# Patient Record
Sex: MALE | Race: BLACK OR AFRICAN AMERICAN | NOT HISPANIC OR LATINO | Employment: UNEMPLOYED | URBAN - METROPOLITAN AREA
[De-identification: names, ages, dates, MRNs, and addresses within clinical notes are randomized per-mention and may not be internally consistent; named-entity substitution may affect disease eponyms.]

---

## 2024-07-22 ENCOUNTER — HOSPITAL ENCOUNTER (EMERGENCY)
Facility: CLINIC | Age: 29
Discharge: HOME | End: 2024-07-22
Attending: EMERGENCY MEDICINE | Admitting: EMERGENCY MEDICINE

## 2024-07-22 ENCOUNTER — APPOINTMENT (OUTPATIENT)
Dept: CT IMAGING | Facility: CLINIC | Age: 29
End: 2024-07-22

## 2024-07-22 VITALS
TEMPERATURE: 97.8 F | BODY MASS INDEX: 22.73 KG/M2 | DIASTOLIC BLOOD PRESSURE: 87 MMHG | HEIGHT: 68 IN | HEART RATE: 55 BPM | SYSTOLIC BLOOD PRESSURE: 134 MMHG | OXYGEN SATURATION: 99 % | RESPIRATION RATE: 18 BRPM | WEIGHT: 150 LBS

## 2024-07-22 DIAGNOSIS — K60.30 ANAL FISTULA: ICD-10-CM

## 2024-07-22 LAB
ALBUMIN SERPL BCG-MCNC: 4.9 G/DL (ref 3.5–5.2)
ALP SERPL-CCNC: 87 U/L (ref 40–150)
ALT SERPL W P-5'-P-CCNC: 14 U/L (ref 0–70)
ANION GAP SERPL CALCULATED.3IONS-SCNC: 11 MMOL/L (ref 7–15)
AST SERPL W P-5'-P-CCNC: 28 U/L (ref 0–45)
BILIRUB DIRECT SERPL-MCNC: <0.2 MG/DL (ref 0–0.3)
BILIRUB SERPL-MCNC: 0.3 MG/DL
BUN SERPL-MCNC: 7.1 MG/DL (ref 6–20)
CALCIUM SERPL-MCNC: 9.7 MG/DL (ref 8.8–10.4)
CHLORIDE SERPL-SCNC: 104 MMOL/L (ref 98–107)
CREAT SERPL-MCNC: 0.88 MG/DL (ref 0.67–1.17)
EGFRCR SERPLBLD CKD-EPI 2021: >90 ML/MIN/1.73M2
ERYTHROCYTE [DISTWIDTH] IN BLOOD BY AUTOMATED COUNT: 11.8 % (ref 10–15)
GLUCOSE SERPL-MCNC: 94 MG/DL (ref 70–99)
HCO3 SERPL-SCNC: 25 MMOL/L (ref 22–29)
HCT VFR BLD AUTO: 47.5 % (ref 40–53)
HGB BLD-MCNC: 16.2 G/DL (ref 13.3–17.7)
LIPASE SERPL-CCNC: 26 U/L (ref 13–60)
MCH RBC QN AUTO: 28.9 PG (ref 26.5–33)
MCHC RBC AUTO-ENTMCNC: 34.1 G/DL (ref 31.5–36.5)
MCV RBC AUTO: 85 FL (ref 78–100)
PLATELET # BLD AUTO: 259 10E3/UL (ref 150–450)
POTASSIUM SERPL-SCNC: 3.9 MMOL/L (ref 3.4–5.3)
PROT SERPL-MCNC: 8.1 G/DL (ref 6.4–8.3)
RBC # BLD AUTO: 5.61 10E6/UL (ref 4.4–5.9)
SODIUM SERPL-SCNC: 140 MMOL/L (ref 135–145)
WBC # BLD AUTO: 10.2 10E3/UL (ref 4–11)

## 2024-07-22 PROCEDURE — 85027 COMPLETE CBC AUTOMATED: CPT

## 2024-07-22 PROCEDURE — 36415 COLL VENOUS BLD VENIPUNCTURE: CPT

## 2024-07-22 PROCEDURE — 80053 COMPREHEN METABOLIC PANEL: CPT

## 2024-07-22 PROCEDURE — 250N000011 HC RX IP 250 OP 636: Performed by: EMERGENCY MEDICINE

## 2024-07-22 PROCEDURE — 82947 ASSAY GLUCOSE BLOOD QUANT: CPT

## 2024-07-22 PROCEDURE — 72193 CT PELVIS W/DYE: CPT

## 2024-07-22 PROCEDURE — 99285 EMERGENCY DEPT VISIT HI MDM: CPT | Mod: 25

## 2024-07-22 PROCEDURE — 83690 ASSAY OF LIPASE: CPT

## 2024-07-22 RX ORDER — IOPAMIDOL 755 MG/ML
90 INJECTION, SOLUTION INTRAVASCULAR ONCE
Status: COMPLETED | OUTPATIENT
Start: 2024-07-22 | End: 2024-07-22

## 2024-07-22 RX ADMIN — IOPAMIDOL 90 ML: 755 INJECTION, SOLUTION INTRAVENOUS at 11:38

## 2024-07-22 ASSESSMENT — COLUMBIA-SUICIDE SEVERITY RATING SCALE - C-SSRS
2. HAVE YOU ACTUALLY HAD ANY THOUGHTS OF KILLING YOURSELF IN THE PAST MONTH?: NO
6. HAVE YOU EVER DONE ANYTHING, STARTED TO DO ANYTHING, OR PREPARED TO DO ANYTHING TO END YOUR LIFE?: NO
1. IN THE PAST MONTH, HAVE YOU WISHED YOU WERE DEAD OR WISHED YOU COULD GO TO SLEEP AND NOT WAKE UP?: NO

## 2024-07-22 ASSESSMENT — ACTIVITIES OF DAILY LIVING (ADL): ADLS_ACUITY_SCORE: 35

## 2024-07-22 NOTE — ED TRIAGE NOTES
PT states he has rectal pain and rectal bleeding. 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 He states the pain today is the worst it has ever been.      Triage Assessment (Adult)       Row Name 07/22/24 0933          Triage Assessment    Airway WDL WDL        Respiratory WDL    Respiratory WDL WDL        Skin Circulation/Temperature WDL    Skin Circulation/Temperature WDL WDL        Cardiac WDL    Cardiac WDL WDL        Peripheral/Neurovascular WDL    Peripheral Neurovascular WDL WDL        Cognitive/Neuro/Behavioral WDL    Cognitive/Neuro/Behavioral WDL WDL

## 2024-07-22 NOTE — DISCHARGE INSTRUCTIONS
You were seen in the emergency department for evaluation of rectal pain. Thankfully, there are no signs of blood stream infection on your labs or imaging.  I prescribed you an antibiotic to take for the next week.  Please take this 2 times a day with food to help prevent GI upset.    Please call the colorectal surgeons today when you leave to set up a follow-up appointment.    You can take tylenol and ibuprofen for pain. You may take 600 mg of ibuprofen (Advil) every 6 hours and 1000 mg acetaminophen (Tylenol) every 6 hours for pain. Do not take more than 3200 mg of ibuprofen (Advil) in 24 hours. Do not take more than 4000 mg of acetaminophen (Tylenol) in 24 hours. You may take this much for 1-3 days, then only use as needed.     Please return to the emergency department if you experience fever, uncontrollable pain, or any other concerning symptoms.

## 2024-07-22 NOTE — ED PROVIDER NOTES
"Emergency Department Midlevel Supervisory Note     I had a face to face encounter with this patient seen by the Advanced Practice Provider (GIAN). I personally made/approved the management plan and take responsibility for the patient management. I personally saw patient and performed a substantive portion of the visit including all aspects of the medical decision making.     ED Course:  10:42 AM Stacia Mackay PA-C staffed patient with me. I agree with their assessment and plan of management, and I will see the patient.  12:19 PM I met with the patient to introduce myself, gather additional history, perform my initial exam, and discuss the plan.     Brief HPI:     Tao Gautam is a 29 year old male who presents for evaluation of     I, Stuart Toussaint, am serving as a scribe to document services personally performed by Дмитрий Ingram MD, based on my observations and the provider's statements to me. I, Дмитрий Ingram MD, attest that Stuart Toussaint is acting in a scribe capacity, has observed my performance of the services and has documented them in accordance with my direction.      Brief Physical Exam: /87   Pulse 55   Temp 97.8  F (36.6  C) (Oral)   Resp 18   Ht 1.727 m (5' 8\")   Wt 68 kg (150 lb)   SpO2 99%   BMI 22.81 kg/m    Constitutional:  Alert, in no acute distress  EYES: Conjunctivae clear  HENT:  Atraumatic  Respiratory:  Respirations even, unlabored, in no acute respiratory distress  Cardiovascular:  Regular rate and rhythm, good peripheral perfusion  GI: Soft, nontender  Musculoskeletal:  Moves all 4 extremities equally, grossly symmetrical strength  Integument: Warm & dry. No appreciable rash, erythema.  Neurologic:  Alert & oriented, speech clear and fluent, no focal deficits noted  Psych: Normal mood and affect       MDM:  Patient is a 29-year-old male presents the emergency department for concerns about rectal pain.  He reportedly in the past had been diagnosed with a perianal fistula. "  He has had increased pain and drainage from this area recently.  He appears nontoxic on arrival with reassuring vital signs, exam, and lab studies.  CT does show the fistula tract, borderline thickening of a portion of this but no severe fluid collections, abscess, etc.  I do think the patient can be managed outpatient with oral antibiotics and a prompt referral to general/colorectal surgery.  He was given contact information for follow-up.  He agrees remainder of ED course and plan as documented by ANA Palomo.      1. Anal fistula          Labs and Imaging:  Results for orders placed or performed during the hospital encounter of 07/22/24   CT Pelvis Soft Tissue w Contrast    Impression    FINDINGS AND IMPRESSION:  There is a soft tissue tract from the posterior aspect of the anus to the medial left gluteal fold, measuring approximately 4 cm in length. The tract may contain fluid measuring 3 mm in width though this is not well assessed with CT. No large or abscess   appreciated on this study. Consider pelvic MRI. Scrotal hydrocele noted.          IMPRESSION:  1.       CBC with platelets   Result Value Ref Range    WBC Count 10.2 4.0 - 11.0 10e3/uL    RBC Count 5.61 4.40 - 5.90 10e6/uL    Hemoglobin 16.2 13.3 - 17.7 g/dL    Hematocrit 47.5 40.0 - 53.0 %    MCV 85 78 - 100 fL    MCH 28.9 26.5 - 33.0 pg    MCHC 34.1 31.5 - 36.5 g/dL    RDW 11.8 10.0 - 15.0 %    Platelet Count 259 150 - 450 10e3/uL   Basic metabolic panel   Result Value Ref Range    Sodium 140 135 - 145 mmol/L    Potassium 3.9 3.4 - 5.3 mmol/L    Chloride 104 98 - 107 mmol/L    Carbon Dioxide (CO2) 25 22 - 29 mmol/L    Anion Gap 11 7 - 15 mmol/L    Urea Nitrogen 7.1 6.0 - 20.0 mg/dL    Creatinine 0.88 0.67 - 1.17 mg/dL    GFR Estimate >90 >60 mL/min/1.73m2    Calcium 9.7 8.8 - 10.4 mg/dL    Glucose 94 70 - 99 mg/dL   Hepatic function panel   Result Value Ref Range    Protein Total 8.1 6.4 - 8.3 g/dL    Albumin 4.9 3.5 - 5.2 g/dL    Bilirubin Total 0.3  <=1.2 mg/dL    Alkaline Phosphatase 87 40 - 150 U/L    AST 28 0 - 45 U/L    ALT 14 0 - 70 U/L    Bilirubin Direct <0.20 0.00 - 0.30 mg/dL   Result Value Ref Range    Lipase 26 13 - 60 U/L           Mariusz Ingram MD  [facility] EMERGENCY ROOM  [facility] [facility] [address] [phone]       Дмитрий Ingram MD  07/22/24 0192

## 2024-07-22 NOTE — ED PROVIDER NOTES
"Emergency Department Encounter   NAME: Tao Gautam  AGE: 29 year old male  YOB: 1995  MRN: [id number]    PCP: No primary care provider on file.  ED PROVIDER: Stacia Mackay PA-C    Evaluation Date & Time:   No admission date for patient encounter.    CHIEF COMPLAINT:  Rectal/perineal Pain      Impression and Plan   MDM: 29-year-old male with no pertinent history presents for evaluation of rectal pain. Patient notes a history of anal fissure from 2019.  At that time he was on immigration facility and they had plan to surgery, but he had left the facility prior to surgery being done. States that over the last month he has had increased pain, bleeding, and drainage from the area. Subjective fevers. Normal bowel movement this morning. A few episodes of vomiting over the last couple days without any abdominal pain. On arrival here patient is vitally stable. Afebrile. No tachycardia. On my examination patient is in no acute distress.  Abdomen is soft and nontender.  External rectal exam showed a small area of induration about 1 cm from the anus at the 9 o'clock position.  No palpable fluctuance.  No erythema.  On palpation I was able to express a small amount of clear fluid from this area, no purulence.  Abdomen is soft and nontender, I have low suspicion for pancreatitis, cholecystitis, obstruction, ischemia.  When I discussed this with the patient, he states that anytime he feels nauseous he self induces vomiting because he likes to \"get it all out\". I offered zofran, but patient declined at this time. Discussed this with patient and he is agreeable to foregoing any abdominal imaging today.  We will do abdominal labs and pursue further imaging if there are any egregious abnormalities.  We discussed plans for pelvis CT to assess for abscess.   Patient declines any pain medication at this time.     Very reassuring lab workup here.  No leukocytosis concerning for ongoing systemic infection/inflammatory " response.  No anemia.  No electrolyte abnormality or [place].  Normal lipase, pancreatitis unlikely.  Normal LFTs, hepatobiliary obstruction is unlikely. CT pelvis with contrast showed tract 4 cm in length from the posterior anus to the medial left gluteal fold without any abscess appreciated.  I discussed all lab and imaging findings with the patient.  Overall reassuring without any evidence of abscess or systemic infection.  We reviewed plan for discharge home with a course of Augmentin and follow-up with surgery.  We reviewed OTC pain control options.  I gave him the phone number for colorectal surgery and also referred him to general surgery.  We discussed use and side effects of Augmentin.  We reviewed strict return precautions and patient was discharged home in stable condition.    I have staffed the patient with Dr. Ingram, ED physician, who will evaluate the patient and agrees with all aspects of today's care.          Medical Decision Making  Obtained supplemental history:Supplemental history obtained?: No  Reviewed external records: External records reviewed?: No  Care impacted by chronic illness:N/A  Care significantly affected by social determinants of health:N/A  Did you consider but not order tests?: Work up considered but not performed and documented in chart, if applicable  Did you interpret images independently?: My independent interpretation of imaging: none  Consultation discussion with other provider:Did you involve another provider (consultant, MH, pharmacy, etc.)?: No  Discharge. I prescribed additional prescription strength medication(s) as charted. N/A.   At the conclusion of the encounter I discussed the results of all the tests and the disposition. The questions were answered. The patient or family acknowledged understanding and was agreeable with the care plan.    FINAL IMPRESSION:    ICD-10-CM    1. Anal fistula  K60.3 Adult Gen Surg  Referral            MEDICATIONS GIVEN IN THE  "EMERGENCY DEPARTMENT:  Medications   iopamidol (ISOVUE-370) solution 90 mL (90 mLs Intravenous $Given 7/22/24 4978)         NEW PRESCRIPTIONS STARTED AT TODAY'S ED VISIT:  Discharge Medication List as of 7/22/2024 12:31 PM        START taking these medications    Details   amoxicillin-clavulanate (AUGMENTIN) 875-125 MG tablet Take 1 tablet by mouth 2 times daily for 7 days, Disp-14 tablet, R-0, Local Print               HPI   Patient information was obtained from: patient   Use of Intrepreter: N/A     Tao Gautam is a 29 year old male with no pertinent history who presents to the ED by car for evaluation of rectal pain. Patient notes a history of anal fissure from 2019.  At that time he was on immigration facility and they had plan to surgery, but he had left the facility prior to surgery being done. States that over the last month he has had increased pain, bleeding, and drainage from the area. [place] feverish over the last two days, but no measured fever. Notes intermittent vomiting over the last three days, but no abdominal pain. No history of abdominal surgery. No melena. No hematuria or dysuria.      REVIEW OF SYSTEMS:  Pertinent positive and negative symptoms per HPI.       Medical History     No past medical history on file.    No past surgical history on file.    No family history on file.         amoxicillin-clavulanate (AUGMENTIN) 875-125 MG tablet          Physical Exam     First Vitals:  Patient Vitals for the past 24 hrs:   BP Temp Temp src Pulse Resp SpO2 Height Weight   07/22/24 0931 134/87 97.8  F (36.6  C) Oral 55 18 99 % 1.727 m (5' 8\") 68 kg (150 lb)       PHYSICAL EXAM:   General Appearance:  Alert, cooperative, no distress, appears stated age  Respiratory: No distress. Lungs clear to ausculation bilaterally. No wheezes, rhonchi or stridor  Cardiovascular: Regular rate and rhythm, no murmur. Normal cap refill. No peripheral edema  GI: Abdomen soft, nontender, normal bowel sounds. External " rectal exam (Chaperoned by EDTKimberly) - small area of induration about 1 cm from the anus at the 9 o'clock position without any erythema or palpable fluctuance. On palpation there is a small amount of clear fluid expressed, but no purulence.   : No CVA tenderness  Musculoskeletal: Moving all extremities. No gross deformities  Integument: Warm, dry, no rashes or lesions  Neurologic: Alert and orientated x3.   Psych: Normal mood and affect      Results     LAB:  All pertinent labs reviewed and interpreted  Labs Ordered and Resulted from Time of ED Arrival to Time of ED Departure   CBC WITH PLATELETS - Normal       Result Value    WBC Count 10.2      RBC Count 5.61      Hemoglobin 16.2      Hematocrit 47.5      MCV 85      MCH 28.9      MCHC 34.1      RDW 11.8      Platelet Count 259     BASIC METABOLIC PANEL - Normal    Sodium 140      Potassium 3.9      Chloride 104      Carbon Dioxide (CO2) 25      Anion Gap 11      Urea Nitrogen 7.1      Creatinine 0.88      GFR Estimate >90      Calcium 9.7      Glucose 94     HEPATIC FUNCTION PANEL - Normal    Protein Total 8.1      Albumin 4.9      Bilirubin Total 0.3      Alkaline Phosphatase 87      AST 28      ALT 14      Bilirubin Direct <0.20     LIPASE - Normal    Lipase 26         RADIOLOGY:  CT Pelvis Soft Tissue w Contrast   Final Result   FINDINGS AND IMPRESSION:   There is a soft tissue tract from the posterior aspect of the anus to the medial left gluteal fold, measuring approximately 4 cm in length. The tract may contain fluid measuring 3 mm in width though this is not well assessed with CT. No large or abscess    appreciated on this study. Consider pelvic MRI. Scrotal hydrocele noted.               IMPRESSION:   1.               Stacia Mackay PA-C   Emergency Medicine   [facility] EMERGENCY ROOM       Stacia Mackay PA-C  07/22/24 2070

## 2024-07-23 NOTE — TELEPHONE ENCOUNTER
Diagnosis, Referred by & from: Anal Fistula   Appt date: 10/7/2024   NOTES STATUS DETAILS   OFFICE NOTE from referring provider N/A    OFFICE NOTE from other specialist N/A    DISCHARGE SUMMARY from hospital N/A    DISCHARGE REPORT from the ER Internal [facility]:  7/22/24 - ED OV with Dr. Ingram   OPERATIVE REPORT N/A    MEDICATION LIST Internal    LABS N/A    DIAGNOSTIC PROCEDURES N/A    IMAGING (DISC & REPORT)      CT Internal [facility]:  7/22/24 - CT Pelvis

## 2024-10-06 ENCOUNTER — HEALTH MAINTENANCE LETTER (OUTPATIENT)
Age: 29
End: 2024-10-06

## 2024-10-07 ENCOUNTER — OFFICE VISIT (OUTPATIENT)
Dept: SURGERY | Facility: CLINIC | Age: 29
End: 2024-10-07
Payer: COMMERCIAL

## 2024-10-07 ENCOUNTER — PRE VISIT (OUTPATIENT)
Dept: SURGERY | Facility: CLINIC | Age: 29
End: 2024-10-07

## 2024-10-07 VITALS
OXYGEN SATURATION: 98 % | SYSTOLIC BLOOD PRESSURE: 132 MMHG | DIASTOLIC BLOOD PRESSURE: 80 MMHG | WEIGHT: 152.1 LBS | BODY MASS INDEX: 23.05 KG/M2 | HEIGHT: 68 IN | HEART RATE: 80 BPM

## 2024-10-07 DIAGNOSIS — K60.30 ANAL FISTULA: Primary | ICD-10-CM

## 2024-10-07 DIAGNOSIS — K60.30 ANAL FISTULA: ICD-10-CM

## 2024-10-07 PROCEDURE — 99203 OFFICE O/P NEW LOW 30 MIN: CPT | Performed by: NURSE PRACTITIONER

## 2024-10-07 ASSESSMENT — PAIN SCALES - GENERAL: PAINLEVEL: NO PAIN (0)

## 2024-10-07 NOTE — NURSING NOTE
"Chief Complaint   Patient presents with    Consult     Anal fistula       Vitals:    10/07/24 1008   BP: 132/80   BP Location: Left arm   Patient Position: Sitting   Cuff Size: Adult Regular   Pulse: 80   SpO2: 98%   Weight: 152 lb 1.6 oz   Height: 5' 8\"       Body mass index is 23.13 kg/m .    Daniela Loyd, EMT  "

## 2024-10-07 NOTE — LETTER
"10/7/2024       RE: Tao Gautam  [address] [place] [address]     Dear Colleague,    Thank you for referring your patient, Tao Gautam, to the [facility] [place] [facility]. Please see a copy of my visit note below.    Colon and Rectal Surgery Consult Clinic Note    Date: 10/7/2024     Referring provider:  Stacia Mackay PA-C  [facility]  [address] [facility] [place]  [place] [address]     RE: Tao Gautam  : 1995  ZEYAD: 10/7/2024    Tao Gautam is a very pleasant 29 year old male here for anal fistula.    HPI:  Anal fistula in 2019 and surgery was planned but he was an immigration facility and left before this was done. He as in the ED on  with increased pain and CT pelvis with contrast showed tract 4 cm in length from the posterior anus to the medial left gluteal fold without any abscess appreciated. He was sent home with a course of Augmentin and referred here.    Physical Examination: Exam was chaperoned by FRANCES Moreno   /80 (BP Location: Left arm, Patient Position: Sitting, Cuff Size: Adult Regular)   Pulse 80   Ht 5' 8\"   Wt 152 lb 1.6 oz   SpO2 98%   BMI 23.13 kg/m    General: alert, oriented, in no acute distress, sitting comfortably  HEENT: mucous membranes moist    Perianal external examination:  External fistula opening in the left lateral perianal position with a palpable tract of induration toward the anus posteriorly. No purulent drainage and no fluctuance or induration.    Digital rectal examination: Was performed but limited due to discomfort  Sphincter tone: Good.  Palpable lesions: No.  Prostate: Not assessed.  Other: None.    Anoscopy: Could not be completed to do patient discomfort    Assessment/Plan: 29 year old male with anal fistula. We had a long discussion regarding treatment including possible fistulotomy and possible seton placement depending on " "muscle involvement. Discussed that if a seton is placed that they could need further surgical intervention and if no tract is able to be found at the time of surgery that we may need to get further workup and imaging. Discussed the risks of developing an abscess and asked them to return to clinic if they develop any worsening symptoms. They stated an understanding of this and wishes to proceed with surgical intervention.       Medical history:  No past medical history on file.    Surgical history:  No past surgical history on file.    Problem list:  There are no problems to display for this patient.      Medications:  No current outpatient medications on file.       Allergies:  No Known Allergies    Family history:  No family history on file.    Social history:  Social History     Tobacco Use     Smoking status: Never     Passive exposure: Never     Smokeless tobacco: Never   Substance Use Topics     Alcohol use: Not on file    Marital status: single.    Nursing Notes:   Daniela Loyd  10/7/2024 10:11 AM  Signed  Chief Complaint   Patient presents with     Consult     Anal fistula       Vitals:    10/07/24 1008   BP: 132/80   BP Location: Left arm   Patient Position: Sitting   Cuff Size: Adult Regular   Pulse: 80   SpO2: 98%   Weight: 152 lb 1.6 oz   Height: 5' 8\"       Body mass index is 23.13 kg/m .    FRANCES Moreno     30 minutes spent on the date of encounter performing chart review, history and exam, documentation and further activities as noted above    LORI So, NP-C  Colon and Rectal Surgery   [facility]    This note was created using speech recognition software and may contain unintended word substitutions.      Again, thank you for allowing me to participate in the care of your patient.      Sincerely,    LORI So CNP    "

## 2024-10-07 NOTE — PROGRESS NOTES
"Colon and Rectal Surgery Consult Clinic Note    Date: 10/7/2024     Referring provider:  Stacia Mackay PA-C  [facility]  [address] [place] [address]     RE: Tao Gautam  : 1995  ZEYAD: 10/7/2024    Tao Gautam is a very pleasant 29 year old male here for anal fistula.    HPI:  Anal fistula in 2019 and surgery was planned but he was an immigration facility and left before this was done. He as in the ED on  with increased pain and CT pelvis with contrast showed tract 4 cm in length from the posterior anus to the medial left gluteal fold without any abscess appreciated. He was sent home with a course of Augmentin and referred here. He gets intermittent pain after bowel movements but if he gets rid of any bleeding and drainage the pain subsides within an hour. No fevers or chills. No difficulty with bowel movements and stools are soft. No diarrhea. No family history of IBD.    Physical Examination: Exam was chaperoned by Daniela Loyd EMT   /80 (BP Location: Left arm, Patient Position: Sitting, Cuff Size: Adult Regular)   Pulse 80   Ht 5' 8\"   Wt 152 lb 1.6 oz   SpO2 98%   BMI 23.13 kg/m    General: alert, oriented, in no acute distress, sitting comfortably  HEENT: mucous membranes moist    Perianal external examination:  External fistula opening in the left lateral perianal position with a palpable tract of induration toward the anus posteriorly. No purulent drainage and no fluctuance or induration.    Digital rectal examination: Was performed but limited due to discomfort  Sphincter tone: Good.  Palpable lesions: No.  Prostate: Not assessed.  Other: None.    Anoscopy: Could not be completed to do patient discomfort    Assessment/Plan: 29 year old male with anal fistula. We had a long discussion regarding treatment including possible fistulotomy and possible seton placement depending on muscle involvement. Discussed that if a seton is placed that they could need " "further surgical intervention and if no tract is able to be found at the time of surgery that we may need to get further workup and imaging. Discussed the risks of developing an abscess and asked them to return to clinic if they develop any worsening symptoms. They stated an understanding of this and wishes to proceed with surgical intervention.       Medical history:  No past medical history on file.    Surgical history:  No past surgical history on file.    Problem list:  There are no problems to display for this patient.      Medications:  No current outpatient medications on file.       Allergies:  No Known Allergies    Family history:  No family history on file.    Social history:  Social History     Tobacco Use    Smoking status: Never     Passive exposure: Never    Smokeless tobacco: Never   Substance Use Topics    Alcohol use: Not on file    Marital status: single.    Nursing Notes:   Daniela Loyd  10/7/2024 10:11 AM  Signed  Chief Complaint   Patient presents with    Consult     Anal fistula       Vitals:    10/07/24 1008   BP: 132/80   BP Location: Left arm   Patient Position: Sitting   Cuff Size: Adult Regular   Pulse: 80   SpO2: 98%   Weight: 152 lb 1.6 oz   Height: 5' 8\"       Body mass index is 23.13 kg/m .    FRANCES Moreno     30 minutes spent on the date of encounter performing chart review, history and exam, documentation and further activities as noted above    LORI Blanc, NP-C  Colon and Rectal Surgery   [facility]    This note was created using speech recognition software and may contain unintended word substitutions.    "

## 2024-10-08 ENCOUNTER — TELEPHONE (OUTPATIENT)
Dept: SURGERY | Facility: CLINIC | Age: 29
End: 2024-10-08
Payer: COMMERCIAL

## 2024-10-08 NOTE — TELEPHONE ENCOUNTER
Called patient in attempt to schedule surgery with Dr. Mead.   Unable to LVM as voicemail is not set up.       Job Shoemaker on 10/8/2024 at 10:08 AM

## 2024-10-14 NOTE — TELEPHONE ENCOUNTER
Called patient in attempt to schedule surgery with Dr. Mead.   Unable to LVM as voicemail is not set up.      Job Shoemaker on 10/14/2024 at 10:24 AM

## 2024-10-21 NOTE — TELEPHONE ENCOUNTER
Called patient in attempt to schedule surgery with Dr. Mead.   Unable to LVM as voicemail is not set up.       Job Shoemaker on 10/21/2024 at 9:59 AM